# Patient Record
Sex: FEMALE | Race: WHITE | URBAN - METROPOLITAN AREA
[De-identification: names, ages, dates, MRNs, and addresses within clinical notes are randomized per-mention and may not be internally consistent; named-entity substitution may affect disease eponyms.]

---

## 2022-04-08 ENCOUNTER — OFFICE VISIT (OUTPATIENT)
Dept: URBAN - METROPOLITAN AREA CLINIC 86 | Facility: CLINIC | Age: 67
End: 2022-04-08
Payer: MEDICARE

## 2022-04-08 DIAGNOSIS — H44.23 DEGENERATIVE MYOPIA, BILATERAL: Primary | ICD-10-CM

## 2022-04-08 DIAGNOSIS — H25.13 AGE-RELATED NUCLEAR CATARACT, BILATERAL: ICD-10-CM

## 2022-04-08 PROCEDURE — 99204 OFFICE O/P NEW MOD 45 MIN: CPT | Performed by: OPHTHALMOLOGY

## 2022-04-08 PROCEDURE — 92134 CPTRZ OPH DX IMG PST SGM RTA: CPT | Performed by: OPHTHALMOLOGY

## 2022-04-08 ASSESSMENT — INTRAOCULAR PRESSURE
OD: 13
OS: 15

## 2022-04-08 NOTE — IMPRESSION/PLAN
Impression: Degenerative myopia, bilateral: H44.23. Bilateral.

OCT: 
OD: wnl OS: RPE changes Plan: The fundi appearance is consistent with myopic degeneration. The patient has a high degree of myopia. The retina is attached with no evidence of peripheral retinal breaks. An OCT today confirmed normal foveal architecture without subretinal fluid or CME. We will continue to observe without treatment at this time. Signs and symptoms of retinal detachment discussed and patient advised to call if any new symptoms. 

RTC 6 DFE/OCT OU re-eval